# Patient Record
Sex: MALE | Race: WHITE | NOT HISPANIC OR LATINO | Employment: UNEMPLOYED | URBAN - METROPOLITAN AREA
[De-identification: names, ages, dates, MRNs, and addresses within clinical notes are randomized per-mention and may not be internally consistent; named-entity substitution may affect disease eponyms.]

---

## 2017-01-13 ENCOUNTER — HOSPITAL ENCOUNTER (OUTPATIENT)
Dept: GENERAL RADIOLOGY | Facility: HOSPITAL | Age: 47
Discharge: HOME | End: 2017-01-13
Admitting: PHYSICIAN ASSISTANT

## 2017-01-13 ENCOUNTER — OFFICE VISIT (OUTPATIENT)
Dept: INTERNAL MEDICINE | Facility: CLINIC | Age: 47
End: 2017-01-13

## 2017-01-13 VITALS
WEIGHT: 178.2 LBS | HEART RATE: 89 BPM | HEIGHT: 63 IN | SYSTOLIC BLOOD PRESSURE: 116 MMHG | RESPIRATION RATE: 20 BRPM | DIASTOLIC BLOOD PRESSURE: 64 MMHG | OXYGEN SATURATION: 99 % | BODY MASS INDEX: 31.57 KG/M2 | TEMPERATURE: 97.6 F

## 2017-01-13 DIAGNOSIS — R39.9 UTI SYMPTOMS: ICD-10-CM

## 2017-01-13 DIAGNOSIS — R11.0 NAUSEA: ICD-10-CM

## 2017-01-13 DIAGNOSIS — M54.50 ACUTE BILATERAL LOW BACK PAIN WITHOUT SCIATICA: Primary | ICD-10-CM

## 2017-01-13 DIAGNOSIS — Z87.442 HISTORY OF KIDNEY STONES: ICD-10-CM

## 2017-01-13 LAB
BILIRUB BLD-MCNC: ABNORMAL MG/DL
CLARITY, POC: ABNORMAL
COLOR UR: ABNORMAL
EXPIRATION DATE: ABNORMAL
GLUCOSE UR STRIP-MCNC: ABNORMAL MG/DL
KETONES UR QL: NEGATIVE
LEUKOCYTE EST, POC: NEGATIVE
Lab: ABNORMAL
NITRITE UR-MCNC: POSITIVE MG/ML
PH UR: 5 [PH] (ref 5–8)
PROT UR STRIP-MCNC: ABNORMAL MG/DL
RBC # UR STRIP: NEGATIVE /UL
SP GR UR: 1.02 (ref 1–1.03)
UROBILINOGEN UR QL: 8

## 2017-01-13 PROCEDURE — 99213 OFFICE O/P EST LOW 20 MIN: CPT | Performed by: PHYSICIAN ASSISTANT

## 2017-01-13 PROCEDURE — 81003 URINALYSIS AUTO W/O SCOPE: CPT | Performed by: PHYSICIAN ASSISTANT

## 2017-01-13 PROCEDURE — 74000 HC ABDOMEN KUB: CPT

## 2017-01-13 RX ORDER — CYCLOBENZAPRINE HCL 10 MG
10 TABLET ORAL 3 TIMES DAILY PRN
Qty: 30 TABLET | Refills: 0 | OUTPATIENT
Start: 2017-01-13 | End: 2020-12-15

## 2017-01-13 RX ORDER — ONDANSETRON HYDROCHLORIDE 8 MG/1
8 TABLET, FILM COATED ORAL EVERY 8 HOURS PRN
Qty: 30 TABLET | Refills: 1 | Status: SHIPPED | OUTPATIENT
Start: 2017-01-13 | End: 2017-06-19 | Stop reason: SDUPTHER

## 2017-01-13 NOTE — PATIENT INSTRUCTIONS
Follow up with urology if flexeril doesn't help back pain.  Go to the ER if develop fever, vomiting or changes in urine occur.

## 2017-01-13 NOTE — MR AVS SNAPSHOT
Jeremiah AIMEE Gibbsam   1/13/2017 1:00 PM   Office Visit    Provider:  VERO Mittal   Department:  [facility] INTERNAL MEDICINE AND PEDIATRICS   Dept Phone:  [phone]                Your Full Care Plan              Today's Medication Changes          These changes are accurate as of: 1/13/17  1:42 PM.  If you have any questions, ask your nurse or doctor.               New Medication(s)Ordered:     cyclobenzaprine 10 MG tablet   Commonly known as:  FLEXERIL   Take 1 tablet by mouth 3 (Three) Times a Day As Needed for muscle spasms.   Started by:  VERO Mittal         Medication(s)that have changed:     ondansetron 8 MG tablet   Commonly known as:  ZOFRAN   Take 1 tablet by mouth Every 8 (Eight) Hours As Needed for nausea or vomiting.   What changed:    - medication strength  - how much to take   Changed by:  VERO Mittal            Where to Get Your Medications      These medications were sent to [address] [phone]  - [phone] [place] [address]     Phone:  [phone]     cyclobenzaprine 10 MG tablet    ondansetron 8 MG tablet                  Your Updated Medication List          This list is accurate as of: 1/13/17  1:42 PM.  Always use your most recent med list.                atorvastatin 40 MG tablet   Commonly known as:  LIPITOR   Take 1 tablet by mouth Daily.       coenzyme Q10 100 MG capsule       cyclobenzaprine 10 MG tablet   Commonly known as:  FLEXERIL   Take 1 tablet by mouth 3 (Three) Times a Day As Needed for muscle spasms.       fenofibrate 145 MG tablet   Commonly known as:  TRICOR   Take 1 tablet by mouth Daily.       gabapentin 400 MG capsule   Commonly known as:  NEURONTIN   Take 1 capsule by mouth At Night As Needed (neuropathy).       HYDROcodone-acetaminophen 5-325 MG per tablet   Commonly known as:  NORCO   Take 1 tablet by mouth Every 8  (Eight) Hours As Needed for severe pain (7-10).       lisinopril 5 MG tablet   Commonly known as:  PRINIVIL,ZESTRIL   Take 1 tablet by mouth Daily.       ondansetron 8 MG tablet   Commonly known as:  ZOFRAN   Take 1 tablet by mouth Every 8 (Eight) Hours As Needed for nausea or vomiting.       sitaGLIPtin-metFORMIN  MG per tablet   Commonly known as:  JANUMET   Take 1 tablet by mouth 2 (Two) Times a Day With Meals.       VIAGRA 100 MG tablet   Generic drug:  sildenafil       vitamin D3 5000 UNITS capsule capsule               We Performed the Following     POC Urinalysis Dipstick, Automated     Urine Culture     XR Abdomen KUB       You Were Diagnosed With        Codes Comments    Acute bilateral low back pain without sciatica    -  Primary ICD-10-CM: M54.5  ICD-9-CM: 724.2, 338.19     History of kidney stones     ICD-10-CM: Z87.442  ICD-9-CM: V13.01     Nausea     ICD-10-CM: R11.0  ICD-9-CM: 787.02     UTI symptoms     ICD-10-CM: R39.9  ICD-9-CM: 788.99       Instructions    Follow up with urology if flexeril doesn't help back pain.  Go to the ER if develop fever, vomiting or changes in urine occur.     Patient Instructions History      [facility] Signup     [facility] allows you to send messages to your doctor, view your test results, renew your prescriptions, schedule appointments, and more. To sign up, go to [employer] and click on the Sign Up Now link in the New User? box. Enter your Read[employer] Activation Code exactly as it appears below along with the last four digits of your Social Security Number and your Date of Birth () to complete the sign-up process. If you do not sign up before the expiration date, you must request a new code.    Read[employer] Activation Code: [email or web address]  Expires: 2017  1:42 PM    If you have questions, you can email [email or web address] or call [phone] to talk to our Read[employer] staff. Remember, [employer] is NOT to be used for urgent needs. For  "medical emergencies, dial 911.               Other Info from Your Visit           Your Appointments     Mar 21, 2017  8:15 AM EDT   Follow Up with Maria E Bustos DO   [facility] INTERNAL MEDICINE AND PEDIATRICS (--)    [address] [address] [phone]           Arrive 15 minutes prior to appointment.              Allergies     Invokana [Canagliflozin]  Shortness Of Breath, Rash    Contrast Dye      Penicillins      Sulfa Antibiotics        Reason for Visit     Flank Pain Stated pain has started to worsen over the last few days, both sides       Vital Signs     Blood Pressure Pulse Temperature Respirations Height Weight    116/64 (BP Location: Left arm, Patient Position: Sitting) 89 97.6 °F (36.4 °C) (Temporal Artery ) 20 63\" (160 cm) 178 lb 3.2 oz (80.8 kg)    Oxygen Saturation Body Mass Index Smoking Status             99% 31.57 kg/m2 Never Smoker         Problems and Diagnoses Noted     History of kidney stones    UTI symptoms    Acute bilateral low back pain without sciatica    -  Primary    Nauseous          Results     POC Urinalysis Dipstick, Automated      Component Value Standard Range & Units    Color Orange Yellow, Straw, Dark Yellow, Татьяна    Clarity, UA Hazy Clear    Glucose, UA >=1000 mg/dL (3+) Negative, 1000 mg/dL (3+) mg/dL    Bilirubin 3 mg/dL Negative    Ketones, UA Negative Negative    Specific Gravity  1.020 1.005 - 1.030    Blood, UA Negative Negative    pH, Urine 5.0 5.0 - 8.0    Protein,  mg/dL Negative mg/dL    Urobilinogen, UA  8 Normal    Leukocytes Negative Negative    Nitrite, UA Positive Negative    Lot Number [id number]     Expiration Date 9-17                         Treatment Goal(s) as of 1/13/2017 at 1:42 PM     1. Count carbohydrates       "

## 2017-01-13 NOTE — PROGRESS NOTES
Subjective   Jeremiah Woods is a 46 y.o. male.   Chief Complaint   Patient presents with   • Flank Pain     Stated pain has started to worsen over the last few days, both sides        History of Present Illness   Pt complains of bilateral back pain x several weeks and worse in the last few days.  It is painful to bending.  It is colicky.  Pt has used pyridium.  Hx of kidney stones.  Has seen urology (Dr Gloria) and lithotripsy in the past.    The following portions of the patient's history were reviewed and updated as appropriate: allergies, current medications and problem list.    Review of Systems   Constitutional: Positive for chills. Negative for fever.   Genitourinary: Negative for difficulty urinating, dysuria, frequency and urgency.   Musculoskeletal: Positive for back pain.       Objective   Physical Exam   Constitutional: He appears well-developed and well-nourished.   HENT:   Head: Normocephalic and atraumatic.   Right Ear: External ear normal.   Left Ear: External ear normal.   Eyes: Conjunctivae are normal.   Cardiovascular: Normal rate, regular rhythm and normal heart sounds.  Exam reveals no gallop and no friction rub.    No murmur heard.  Pulmonary/Chest: Effort normal and breath sounds normal.   Abdominal: Normal appearance and bowel sounds are normal. There is no tenderness. There is CVA tenderness.   Psychiatric: He has a normal mood and affect.   Vitals reviewed.      Assessment/Plan   Jeremiah was seen today for flank pain.    Diagnoses and all orders for this visit:    Acute bilateral low back pain without sciatica  -     POC Urinalysis Dipstick, Automated  -     Urine Culture  -     cyclobenzaprine (FLEXERIL) 10 MG tablet; Take 1 tablet by mouth 3 (Three) Times a Day As Needed for muscle spasms.  -     XR Abdomen KUB    History of kidney stones  -     XR Abdomen KUB    Nausea    UTI symptoms  -     ondansetron (ZOFRAN) 8 MG tablet; Take 1 tablet by mouth Every 8 (Eight) Hours As Needed for  nausea or vomiting.    This seems musculoskeletal in nature given the bilateral nature and no hematuria.  He will f/u next week with urologist if no better..

## 2017-01-15 LAB
BACTERIA UR CULT: NO GROWTH
BACTERIA UR CULT: NORMAL

## 2017-01-16 ENCOUNTER — TELEPHONE (OUTPATIENT)
Dept: INTERNAL MEDICINE | Facility: CLINIC | Age: 47
End: 2017-01-16

## 2017-01-16 DIAGNOSIS — M54.50 ACUTE BILATERAL LOW BACK PAIN WITHOUT SCIATICA: Primary | ICD-10-CM

## 2017-01-16 DIAGNOSIS — Z87.442 HISTORY OF KIDNEY STONES: ICD-10-CM

## 2017-01-16 DIAGNOSIS — M54.9 CVA TENDERNESS: ICD-10-CM

## 2017-01-16 RX ORDER — ALOGLIPTIN AND METFORMIN HYDROCHLORIDE 12.5; 5 MG/1; MG/1
1 TABLET, FILM COATED ORAL 2 TIMES DAILY
Qty: 60 TABLET | Refills: 0 | Status: SHIPPED | OUTPATIENT
Start: 2017-01-16 | End: 2017-01-30 | Stop reason: ALTCHOICE

## 2017-01-16 NOTE — TELEPHONE ENCOUNTER
----- Message from Elva Altamirano sent at 1/16/2017  3:01 PM EST -----  Contact: self  Call back number [phone]    Would like results of scan

## 2017-01-17 NOTE — TELEPHONE ENCOUNTER
Thought i sent a results noted to you.    His urine culture didn't grow bacteria.  Xray of belly didn't show any stones blocking the outflow of urine.  How is he doing?

## 2017-01-18 ENCOUNTER — TELEPHONE (OUTPATIENT)
Dept: INTERNAL MEDICINE | Facility: CLINIC | Age: 47
End: 2017-01-18

## 2017-01-18 NOTE — TELEPHONE ENCOUNTER
----- Message from Lois Goodman sent at 1/17/2017  3:30 PM EST -----  Contact: [phone]  PT CALLED TO GET RESULTS OF CT SCAN

## 2017-01-18 NOTE — TELEPHONE ENCOUNTER
Spoke with pt and he stated that he would like to do the CT Scan. He has not made the appt with the Urologist.

## 2017-01-18 NOTE — TELEPHONE ENCOUNTER
Spoke with pt and informed him of urine cx as well as xray results. Pt stated that he is still in a lot of pain (pain scale is at 8) and the pain is not getting better. What are you planning on doing next?

## 2017-01-19 ENCOUNTER — HOSPITAL ENCOUNTER (OUTPATIENT)
Dept: CT IMAGING | Facility: HOSPITAL | Age: 47
Discharge: HOME | End: 2017-01-19
Admitting: PHYSICIAN ASSISTANT

## 2017-01-19 DIAGNOSIS — M54.6 ACUTE THORACIC BACK PAIN, UNSPECIFIED BACK PAIN LATERALITY: Primary | ICD-10-CM

## 2017-01-19 DIAGNOSIS — Z87.442 HISTORY OF KIDNEY STONES: ICD-10-CM

## 2017-01-19 DIAGNOSIS — M54.50 ACUTE BILATERAL LOW BACK PAIN WITHOUT SCIATICA: ICD-10-CM

## 2017-01-19 DIAGNOSIS — M54.9 CVA TENDERNESS: ICD-10-CM

## 2017-01-19 PROCEDURE — 74176 CT ABD & PELVIS W/O CONTRAST: CPT

## 2017-01-30 ENCOUNTER — TELEPHONE (OUTPATIENT)
Dept: INTERNAL MEDICINE | Facility: CLINIC | Age: 47
End: 2017-01-30

## 2017-01-30 RX ORDER — METFORMIN HYDROCHLORIDE EXTENDED-RELEASE TABLETS 1000 MG/1
1000 TABLET, FILM COATED, EXTENDED RELEASE ORAL 2 TIMES DAILY WITH MEALS
Qty: 60 TABLET | Refills: 2 | Status: SHIPPED | OUTPATIENT
Start: 2017-01-30 | End: 2017-01-30

## 2017-01-30 NOTE — TELEPHONE ENCOUNTER
Spoke with pt and , pt is going to check his sugar and call us back. According to what his sugar is we may restart him on metformin and discontinue janumet.

## 2017-01-30 NOTE — TELEPHONE ENCOUNTER
Spoke with pt and he states BP has been running normal. After talking with  she suggest that he D/C the Janumet and would start him on metformin 1000 mg 1 tab PO BID. Pt has been informed of this and will check sugar daily and let us know how he is doing on it.

## 2017-01-30 NOTE — TELEPHONE ENCOUNTER
With his glucose being 223 I am not for certain it is the Janumet as he has been on this before and fine. How is his BP running.

## 2017-04-07 NOTE — TELEPHONE ENCOUNTER
[phone]    PT NEEDS REFILL OF METFORMIN-IS OUT SO NEEDS TODAY PLEASE    [facility] ANGELIASALEXANDRO

## 2017-04-10 ENCOUNTER — OFFICE VISIT (OUTPATIENT)
Dept: INTERNAL MEDICINE | Facility: CLINIC | Age: 47
End: 2017-04-10

## 2017-04-10 VITALS
HEART RATE: 82 BPM | WEIGHT: 170 LBS | DIASTOLIC BLOOD PRESSURE: 78 MMHG | SYSTOLIC BLOOD PRESSURE: 108 MMHG | BODY MASS INDEX: 30.11 KG/M2 | RESPIRATION RATE: 16 BRPM

## 2017-04-10 DIAGNOSIS — G62.9 NEUROPATHY: ICD-10-CM

## 2017-04-10 DIAGNOSIS — E11.69 TYPE 2 DIABETES MELLITUS WITH OTHER SPECIFIED COMPLICATION, WITHOUT LONG-TERM CURRENT USE OF INSULIN (HCC): ICD-10-CM

## 2017-04-10 DIAGNOSIS — Z23 NEED FOR VACCINATION WITH 13-POLYVALENT PNEUMOCOCCAL CONJUGATE VACCINE: ICD-10-CM

## 2017-04-10 DIAGNOSIS — I10 ESSENTIAL HYPERTENSION: ICD-10-CM

## 2017-04-10 DIAGNOSIS — E78.5 HYPERLIPIDEMIA, UNSPECIFIED HYPERLIPIDEMIA TYPE: Primary | ICD-10-CM

## 2017-04-10 DIAGNOSIS — N52.9 IMPOTENCE OF ORGANIC ORIGIN: ICD-10-CM

## 2017-04-10 LAB
A/C: NORMAL
EXPIRATION DATE: NORMAL
EXPIRATION DATE: NORMAL
HBA1C MFR BLD: 8.6 %
Lab: NORMAL
Lab: NORMAL
POC CREATININE URINE: 200
POC MICROALBUMIN URINE: 80

## 2017-04-10 PROCEDURE — 99214 OFFICE O/P EST MOD 30 MIN: CPT | Performed by: INTERNAL MEDICINE

## 2017-04-10 PROCEDURE — 36415 COLL VENOUS BLD VENIPUNCTURE: CPT | Performed by: INTERNAL MEDICINE

## 2017-04-10 PROCEDURE — 83036 HEMOGLOBIN GLYCOSYLATED A1C: CPT | Performed by: INTERNAL MEDICINE

## 2017-04-10 PROCEDURE — 82044 UR ALBUMIN SEMIQUANTITATIVE: CPT | Performed by: INTERNAL MEDICINE

## 2017-04-10 PROCEDURE — 90670 PCV13 VACCINE IM: CPT | Performed by: INTERNAL MEDICINE

## 2017-04-10 PROCEDURE — 90471 IMMUNIZATION ADMIN: CPT | Performed by: INTERNAL MEDICINE

## 2017-04-10 RX ORDER — LISINOPRIL 2.5 MG/1
2.5 TABLET ORAL DAILY
Qty: 30 TABLET | Refills: 5 | OUTPATIENT
Start: 2017-04-10 | End: 2020-12-15

## 2017-04-10 NOTE — PROGRESS NOTES
Subjective   Jeremiah Woods is a 47 y.o. male.   Pt is here to follow up on HTN,HPL,DM2,neuropathy and ED.             History of Present Illness   Pt is here to follow up on HTN,HPL,DM2,neuropathy and ED.   All chronic issues are doing well. He has no acute complaints.               The following portions of the patient's history were reviewed and updated as appropriate: allergies, current medications, past family history, past medical history, past social history, past surgical history and problem list.               Review of Systems   Constitutional: Negative.    HENT: Negative.    Eyes: Negative.    Respiratory: Negative.    Cardiovascular:        See HPI.    Gastrointestinal: Negative.    Endocrine:        See HPI.    Genitourinary:        See HPI.    Musculoskeletal: Negative.    Skin: Negative.    Allergic/Immunologic: Negative.    Neurological:        See HPI.    Hematological: Negative.    Psychiatric/Behavioral: Negative.                   Objective   Physical Exam   Constitutional: He is oriented to person, place, and time. He appears well-developed and well-nourished.   HENT:   Head: Normocephalic and atraumatic.   Right Ear: External ear normal.   Left Ear: External ear normal.   Nose: Nose normal.   Mouth/Throat: Oropharynx is clear and moist.   Eyes: Conjunctivae and EOM are normal. Pupils are equal, round, and reactive to light.   Neck: Normal range of motion. Neck supple. No tracheal deviation present. No thyromegaly present.   Cardiovascular: Normal rate, regular rhythm, normal heart sounds and intact distal pulses.    Pulmonary/Chest: Effort normal and breath sounds normal.   Abdominal: Soft. Bowel sounds are normal. He exhibits no distension. There is no tenderness.    Jeremiah had a diabetic foot exam performed today.   During the foot exam he had a monofilament test performed.    Neurological Sensory Findings - Unaltered hot/cold right ankle/foot discrimination and unaltered hot/cold left  ankle/foot discrimination. Unaltered sharp/dull right ankle/foot discrimination and unaltered sharp/dull left ankle/foot discrimination. No right ankle/foot altered proprioception and no left ankle/foot altered proprioception    Vascular Status -  His exam exhibits right foot vasculature normal. His exam exhibits no right foot edema. His exam exhibits left foot vasculature normal. His exam exhibits no left foot edema.   Foot Structure and Biomechanics -  His right foot has no hallux valgus, no pes cavus, no claw toes, no hammer toes and no cavovarus present. His left foot has no hallux valgus, no pes cavus, no claw toes, no hammer toes and no cavovarus.      Neurological: He is alert and oriented to person, place, and time. He has normal reflexes.   Skin: Skin is warm and dry.   Psychiatric: He has a normal mood and affect.   Nursing note and vitals reviewed.              Assessment/Plan    Hyperlipidemia, unspecified hyperlipidemia type    Essential hypertension    Type 2 diabetes mellitus with other specified complication, without long-term current use of insulin  -     lisinopril (ZESTRIL) 2.5 MG tablet; Take 1 tablet by mouth Daily.  -     Comprehensive Metabolic Panel  -     Lipid Panel  -     CBC (No Diff)  -     POC Microalbumin  -     POC Glycosylated Hemoglobin (Hb A1C)    Neuropathy    Impotence of organic origin    Need for vaccination with 13-polyvalent pneumococcal conjugate vaccine  -     Pneumococcal Conjugate Vaccine 13-Valent All        1.) Foot exam done today   2.) Check CBC,CMP,lipid panel, A1C and microalbumin.   3.) Prevnar 13 received today

## 2017-04-11 LAB
ALBUMIN SERPL-MCNC: 4.5 G/DL (ref 3.2–4.8)
ALBUMIN/GLOB SERPL: 1.5 G/DL (ref 1.5–2.5)
ALP SERPL-CCNC: 76 U/L (ref 25–100)
ALT SERPL-CCNC: 58 U/L (ref 7–40)
AST SERPL-CCNC: 33 U/L (ref 0–33)
BILIRUB SERPL-MCNC: 0.7 MG/DL (ref 0.3–1.2)
BUN SERPL-MCNC: 18 MG/DL (ref 9–23)
BUN/CREAT SERPL: 18 (ref 7–25)
CALCIUM SERPL-MCNC: 10.1 MG/DL (ref 8.7–10.4)
CHLORIDE SERPL-SCNC: 102 MMOL/L (ref 99–109)
CHOLEST SERPL-MCNC: 198 MG/DL (ref 0–200)
CO2 SERPL-SCNC: 21 MMOL/L (ref 20–31)
CREAT SERPL-MCNC: 1 MG/DL (ref 0.6–1.3)
ERYTHROCYTE [DISTWIDTH] IN BLOOD BY AUTOMATED COUNT: 14.4 % (ref 11.3–14.5)
GLOBULIN SER CALC-MCNC: 3 GM/DL
GLUCOSE SERPL-MCNC: 181 MG/DL (ref 70–100)
HCT VFR BLD AUTO: 43.2 % (ref 38.9–50.9)
HDLC SERPL-MCNC: 26 MG/DL (ref 40–60)
HGB BLD-MCNC: 14.2 G/DL (ref 13.1–17.5)
LDLC SERPL CALC-MCNC: ABNORMAL MG/DL
MCH RBC QN AUTO: 28.8 PG (ref 27–31)
MCHC RBC AUTO-ENTMCNC: 32.9 G/DL (ref 32–36)
MCV RBC AUTO: 87.6 FL (ref 80–99)
PLATELET # BLD AUTO: 231 10*3/MM3 (ref 150–450)
POTASSIUM SERPL-SCNC: 4.1 MMOL/L (ref 3.5–5.5)
PROT SERPL-MCNC: 7.5 G/DL (ref 5.7–8.2)
RBC # BLD AUTO: 4.93 10*6/MM3 (ref 4.2–5.76)
SODIUM SERPL-SCNC: 137 MMOL/L (ref 132–146)
TRIGL SERPL-MCNC: 785 MG/DL (ref 0–150)
VLDLC SERPL CALC-MCNC: ABNORMAL MG/DL
WBC # BLD AUTO: 6.37 10*3/MM3 (ref 3.5–10.8)

## 2017-04-13 ENCOUNTER — TELEPHONE (OUTPATIENT)
Dept: INTERNAL MEDICINE | Facility: CLINIC | Age: 47
End: 2017-04-13

## 2017-04-13 NOTE — TELEPHONE ENCOUNTER
His A1C was 8.6% basically it was the same as prior.     Is he just taking Metformin? I thought he was taking januvia or Janumet, I do not see it on his med rec.     Once I know what he is taking for sure we can adjust his regimen I would like to get him under  7%.     Triglycerides were too high as well, I think he told me he had not been taking his cholesterol med and was going to start back. When he was taking it cholesterol was in optimal range.

## 2017-04-13 NOTE — TELEPHONE ENCOUNTER
----- Message from Tiffani Joseph sent at 4/13/2017  1:07 PM EDT -----  PT CALLED WANTING TO KNOW HIS LABS RESULTS FROM HIS LAST APPT.       HE CAN BE REACHED [phone]

## 2017-04-14 ENCOUNTER — TELEPHONE (OUTPATIENT)
Dept: INTERNAL MEDICINE | Facility: CLINIC | Age: 47
End: 2017-04-14

## 2017-04-14 NOTE — TELEPHONE ENCOUNTER
----- Message from Tami Lindo sent at 4/14/2017 11:54 AM EDT -----  PT CALLING FOR RESULTS FROM RECENT APPT TEST.      CAN BE reached [phone]

## 2017-04-14 NOTE — TELEPHONE ENCOUNTER
Spoke with pt and informed him of labs, that we are working on either adding or changing his diabetes regimen. Also he asked if he should start taking fish oil again?

## 2017-04-18 RX ORDER — PIOGLITAZONEHYDROCHLORIDE 15 MG/1
15 TABLET ORAL DAILY
Qty: 30 TABLET | Refills: 3 | Status: SHIPPED | OUTPATIENT
Start: 2017-04-18 | End: 2017-08-14

## 2017-04-18 NOTE — TELEPHONE ENCOUNTER
I spoke with pt and he states he will try the Actos 15 mg once daily and will let us know how he is doing.     Rx has been sent to pt's pharmacy.

## 2017-04-18 NOTE — TELEPHONE ENCOUNTER
He has had an issue with so many meds let me make sure I bring us up to date so we wont have to keep searching back:  His glucose was fine on Janumet but then insurance stopped paying.     At that time we tried:  Invokana-caused rash   Jentadueta and Januvia- he felt light headed     We then switched him back to Janumet and he felt like he was having side effects from there as well.     Since that time he has remained on metformin 1000 mg PO BID     At this point we can :   1.) start him on Actos 15 mg PO daily #30 with 3 refills along with continuing metformin as is    or    2.) If he is interested I could try a once a week insulin to add to the metformin twice daily? It is called Trulicity and we would have to see about insurance approval.

## 2017-05-02 ENCOUNTER — TELEPHONE (OUTPATIENT)
Dept: INTERNAL MEDICINE | Facility: CLINIC | Age: 47
End: 2017-05-02

## 2017-05-04 ENCOUNTER — TELEPHONE (OUTPATIENT)
Dept: INTERNAL MEDICINE | Facility: CLINIC | Age: 47
End: 2017-05-04

## 2017-05-10 RX ORDER — GLIPIZIDE 5 MG/1
5 TABLET ORAL
Qty: 60 TABLET | Refills: 5 | Status: SHIPPED | OUTPATIENT
Start: 2017-05-10 | End: 2017-06-19 | Stop reason: SDUPTHER

## 2017-05-18 ENCOUNTER — OFFICE VISIT (OUTPATIENT)
Dept: INTERNAL MEDICINE | Facility: CLINIC | Age: 47
End: 2017-05-18

## 2017-05-18 VITALS
SYSTOLIC BLOOD PRESSURE: 102 MMHG | BODY MASS INDEX: 30.16 KG/M2 | DIASTOLIC BLOOD PRESSURE: 62 MMHG | HEART RATE: 88 BPM | RESPIRATION RATE: 16 BRPM | HEIGHT: 63 IN | WEIGHT: 170.2 LBS

## 2017-05-18 DIAGNOSIS — H92.03 OTALGIA OF BOTH EARS: Primary | ICD-10-CM

## 2017-05-18 PROCEDURE — 99213 OFFICE O/P EST LOW 20 MIN: CPT | Performed by: PHYSICIAN ASSISTANT

## 2017-05-18 RX ORDER — LISINOPRIL 5 MG/1
5 TABLET ORAL DAILY
COMMUNITY
Start: 2017-04-27 | End: 2017-09-11 | Stop reason: DRUGHIGH

## 2017-06-19 ENCOUNTER — TELEPHONE (OUTPATIENT)
Dept: INTERNAL MEDICINE | Facility: CLINIC | Age: 47
End: 2017-06-19

## 2017-06-19 DIAGNOSIS — R39.9 UTI SYMPTOMS: ICD-10-CM

## 2017-06-19 RX ORDER — METFORMIN HYDROCHLORIDE 500 MG/1
TABLET, EXTENDED RELEASE ORAL
Qty: 120 TABLET | Refills: 0 | Status: SHIPPED | OUTPATIENT
Start: 2017-06-19 | End: 2017-08-14 | Stop reason: DRUGHIGH

## 2017-06-19 RX ORDER — GLIPIZIDE 5 MG/1
5 TABLET ORAL
Qty: 60 TABLET | Refills: 5 | Status: SHIPPED | OUTPATIENT
Start: 2017-06-19

## 2017-06-19 RX ORDER — ONDANSETRON HYDROCHLORIDE 8 MG/1
8 TABLET, FILM COATED ORAL EVERY 8 HOURS PRN
Qty: 30 TABLET | Refills: 1 | Status: SHIPPED | OUTPATIENT
Start: 2017-06-19 | End: 2017-12-22 | Stop reason: SDUPTHER

## 2017-06-19 RX ORDER — GLIPIZIDE 10 MG/1
TABLET ORAL
Qty: 90 TABLET | Refills: 0 | OUTPATIENT
Start: 2017-06-19

## 2017-06-19 NOTE — TELEPHONE ENCOUNTER
----- Message from Ryan Albarran sent at 6/19/2017 12:29 PM EDT -----  Contact: SELF  PATIENT SAYS HE NEED A REFILL ON METFORMIN, GLIPIZIDE, AND ONDANSETRON. PATIENTS PRIMARY PHARMACY IS [facility] IN [place]. A GOOD CALL BACK NUMBER [phone]. THANK YOU.

## 2017-06-19 NOTE — TELEPHONE ENCOUNTER
Pt is going on vacation and may go fishing. States that being on a boat makes him nauseated. He would like to know if there is anything that may help with this? States that Dramamine makes him very drowsy so he would rather not take it.

## 2017-06-20 DIAGNOSIS — T75.3XXA MOTION SICKNESS, INITIAL ENCOUNTER: Primary | ICD-10-CM

## 2017-06-20 RX ORDER — SCOLOPAMINE TRANSDERMAL SYSTEM 1 MG/1
1 PATCH, EXTENDED RELEASE TRANSDERMAL
Qty: 4 PATCH | Refills: 1 | Status: SHIPPED | OUTPATIENT
Start: 2017-06-20 | End: 2017-09-11

## 2017-08-14 ENCOUNTER — TELEPHONE (OUTPATIENT)
Dept: INTERNAL MEDICINE | Facility: CLINIC | Age: 47
End: 2017-08-14

## 2017-08-14 ENCOUNTER — OFFICE VISIT (OUTPATIENT)
Dept: INTERNAL MEDICINE | Facility: CLINIC | Age: 47
End: 2017-08-14

## 2017-08-14 VITALS
BODY MASS INDEX: 31.22 KG/M2 | HEART RATE: 80 BPM | WEIGHT: 176.25 LBS | SYSTOLIC BLOOD PRESSURE: 104 MMHG | DIASTOLIC BLOOD PRESSURE: 62 MMHG | RESPIRATION RATE: 16 BRPM

## 2017-08-14 DIAGNOSIS — R10.9 FLANK PAIN, ACUTE: Primary | ICD-10-CM

## 2017-08-14 LAB
BILIRUB BLD-MCNC: NEGATIVE MG/DL
CLARITY, POC: CLEAR
COLOR UR: YELLOW
EXPIRATION DATE: NORMAL
GLUCOSE UR STRIP-MCNC: NEGATIVE MG/DL
KETONES UR QL: NEGATIVE
LEUKOCYTE EST, POC: NEGATIVE
Lab: NORMAL
NITRITE UR-MCNC: NEGATIVE MG/ML
PH UR: 5 [PH] (ref 5–8)
PROT UR STRIP-MCNC: NEGATIVE MG/DL
RBC # UR STRIP: NEGATIVE /UL
SP GR UR: 1.02 (ref 1–1.03)
UROBILINOGEN UR QL: NORMAL

## 2017-08-14 PROCEDURE — 99214 OFFICE O/P EST MOD 30 MIN: CPT | Performed by: INTERNAL MEDICINE

## 2017-08-14 PROCEDURE — 81003 URINALYSIS AUTO W/O SCOPE: CPT | Performed by: INTERNAL MEDICINE

## 2017-08-14 RX ORDER — HYDROCODONE BITARTRATE AND ACETAMINOPHEN 5; 325 MG/1; MG/1
1 TABLET ORAL EVERY 8 HOURS PRN
Qty: 20 TABLET | Refills: 0 | Status: SHIPPED | OUTPATIENT
Start: 2017-08-14 | End: 2017-12-22

## 2017-08-14 NOTE — TELEPHONE ENCOUNTER
Let patient know urinalysis was completely negative no blood, white cells or nitrites. If low back pain does not resolve we will need to do CT scan to check for kidney stones.

## 2017-08-14 NOTE — PROGRESS NOTES
Subjective   Jeremiah Woods is a 47 y.o. male.   Pt presents today with bilateral flank pain.               History of Present Illness   Pt presetns today with bilateral flank pain. He states he has chilled as well. Symptoms started 1 month ago off and on, and then over the weekend it increased in pain in consistency. He denies hematuria.   He has had multiple episodes of Nephrolithiasis in the past.             The following portions of the patient's history were reviewed and updated as appropriate: allergies, current medications, past family history, past medical history, past social history, past surgical history and problem list.           Review of Systems   Constitutional: Negative.    HENT: Negative.    Eyes: Negative.    Respiratory: Negative.    Cardiovascular: Negative.    Gastrointestinal: Negative.    Endocrine: Negative.    Genitourinary:        See HPI.    Musculoskeletal: Negative.    Skin: Negative.    Allergic/Immunologic: Negative.    Neurological: Negative.    Hematological: Negative.    Psychiatric/Behavioral: Negative.               Objective   Physical Exam   Constitutional: He is oriented to person, place, and time. He appears well-developed and well-nourished.   HENT:   Head: Normocephalic and atraumatic.   Cardiovascular: Normal rate, regular rhythm and normal heart sounds.    Pulmonary/Chest: Effort normal and breath sounds normal.   Abdominal: Soft. Bowel sounds are normal.   Bilateral flank pain to palpation.    Neurological: He is alert and oriented to person, place, and time.   Skin: Skin is warm and dry.   Psychiatric: He has a normal mood and affect.   Nursing note and vitals reviewed.            Assessment/Plan    Flank pain, acute  -     POCT urinalysis dipstick, automated  -     HYDROcodone-acetaminophen (NORCO) 5-325 MG per tablet; Take 1 tablet by mouth Every 8 (Eight) Hours As Needed for Severe Pain (7-10) (flank pain).        1.) Urinalysis ( if positive for infection will  send in Cipro)   2.) Lortab 5 mg PO every 8 hours prn #20 with on refills. ( for flank pain and incase of kidney stones he has had numerous in the past and this is more likely to be the case now, if Urinalysis negative for infection we will order CT to check).

## 2017-09-11 ENCOUNTER — TELEPHONE (OUTPATIENT)
Dept: INTERNAL MEDICINE | Facility: CLINIC | Age: 47
End: 2017-09-11

## 2017-09-11 ENCOUNTER — OFFICE VISIT (OUTPATIENT)
Dept: INTERNAL MEDICINE | Facility: CLINIC | Age: 47
End: 2017-09-11

## 2017-09-11 VITALS
RESPIRATION RATE: 18 BRPM | WEIGHT: 173 LBS | OXYGEN SATURATION: 98 % | SYSTOLIC BLOOD PRESSURE: 102 MMHG | BODY MASS INDEX: 30.65 KG/M2 | TEMPERATURE: 98.4 F | DIASTOLIC BLOOD PRESSURE: 70 MMHG | HEART RATE: 86 BPM

## 2017-09-11 DIAGNOSIS — R30.9 PAINFUL URINATION: Primary | ICD-10-CM

## 2017-09-11 DIAGNOSIS — R10.9 FLANK PAIN: ICD-10-CM

## 2017-09-11 LAB
BACTERIA UR QL AUTO: ABNORMAL /HPF
BILIRUB BLD-MCNC: NEGATIVE MG/DL
CLARITY, POC: CLEAR
COLOR UR: YELLOW
EXPIRATION DATE: ABNORMAL
GLUCOSE UR STRIP-MCNC: ABNORMAL MG/DL
HYALINE CASTS UR QL AUTO: ABNORMAL /LPF
KETONES UR QL: NEGATIVE
LEUKOCYTE EST, POC: NEGATIVE
Lab: ABNORMAL
NITRITE UR-MCNC: NEGATIVE MG/ML
PH UR: 6 [PH] (ref 5–8)
PROT UR STRIP-MCNC: NEGATIVE MG/DL
RBC # UR STRIP: NEGATIVE /UL
RBC # UR: ABNORMAL /HPF
REF LAB TEST METHOD: ABNORMAL
SP GR UR: 1.02 (ref 1–1.03)
SQUAMOUS #/AREA URNS HPF: ABNORMAL /HPF
UROBILINOGEN UR QL: NORMAL
WBC UR QL AUTO: ABNORMAL /HPF

## 2017-09-11 PROCEDURE — 99214 OFFICE O/P EST MOD 30 MIN: CPT | Performed by: NURSE PRACTITIONER

## 2017-09-11 PROCEDURE — 87086 URINE CULTURE/COLONY COUNT: CPT | Performed by: NURSE PRACTITIONER

## 2017-09-11 PROCEDURE — 81015 MICROSCOPIC EXAM OF URINE: CPT | Performed by: NURSE PRACTITIONER

## 2017-09-11 PROCEDURE — 81003 URINALYSIS AUTO W/O SCOPE: CPT | Performed by: NURSE PRACTITIONER

## 2017-09-11 NOTE — TELEPHONE ENCOUNTER
----- Message from Татьяна Mittal sent at 9/11/2017  2:22 PM EDT -----  PT NEEDS RENEWAL FOR METFORMIN SENT TO [facility] [place]    PATIENT: [phone]

## 2017-09-11 NOTE — PROGRESS NOTES
Chief Complaint   Patient presents with   • Back Pain        Subjective     History of Present Illness   The pt is here today with reports of B flank R>L not doing better.  PMH stones.    The pt was seen 8/14/17 in [facility]. Has lortab but tries not to take.  Wife thinks he feels good and he has had chills and warmth and had had sinus problems doing better with sinus.    Occasional dysuria.     The following portions of the patient's history were reviewed and updated as appropriate: allergies, current medications, past family history, past medical history, past social history, past surgical history and problem list.    Review of Systems   Constitutional: Positive for chills. Negative for activity change, appetite change, fatigue, fever and unexpected weight change.   HENT: Negative for congestion.    Respiratory: Negative for shortness of breath.    Cardiovascular: Negative for chest pain and leg swelling.   Gastrointestinal: Positive for diarrhea and nausea. Negative for abdominal pain and vomiting.   Genitourinary: Positive for difficulty urinating. Negative for hematuria.   Musculoskeletal: Positive for back pain.   Neurological: Negative for dizziness and headaches.   Psychiatric/Behavioral: Positive for sleep disturbance.   All other systems reviewed and are negative.      Objective   Physical Exam   Constitutional: He is oriented to person, place, and time. He appears well-developed and well-nourished.   HENT:   Head: Normocephalic and atraumatic.   Right Ear: External ear normal.   Left Ear: External ear normal.   Nose: Nose normal.   Mouth/Throat: Oropharynx is clear and moist.   Eyes: Conjunctivae are normal. Pupils are equal, round, and reactive to light. No scleral icterus.   Neck: Neck supple. No thyromegaly present.   Cardiovascular: Normal rate and regular rhythm.    Pulmonary/Chest: Effort normal and breath sounds normal.   Abdominal: Bowel sounds are normal. He exhibits no distension and no mass.  There is no tenderness. There is no rebound and no guarding. No hernia.   Musculoskeletal: Normal range of motion.   B cva ttp   Lymphadenopathy:     He has no cervical adenopathy.   Neurological: He is alert and oriented to person, place, and time.   Skin: Skin is warm and dry.   Psychiatric: He has a normal mood and affect. His behavior is normal.   Nursing note and vitals reviewed.    B flank pain  Results for orders placed or performed in visit on 09/11/17   Urine Culture   Result Value Ref Range    Urine Culture No growth at 2 days    Urinalysis, Microscopic Only   Result Value Ref Range    RBC, UA 3-6 (A) None Seen, 0-2 /HPF    WBC, UA 0-2 (A) None Seen /HPF    Bacteria, UA None Seen None Seen, Trace /HPF    Squamous Epithelial Cells, UA 0-2 None Seen, 0-2 /HPF    Hyaline Casts, UA 0-6 0 - 6 /LPF    Methodology Automated Microscopy    POCT urinalysis dipstick, automated   Result Value Ref Range    Color Yellow Yellow, Straw, Dark Yellow, Татьяна    Clarity, UA Clear Clear    Glucose,  mg/dL (A) Negative, 1000 mg/dL (3+) mg/dL    Bilirubin Negative Negative    Ketones, UA Negative Negative    Specific Gravity  1.020 1.005 - 1.030    Blood, UA Negative Negative    pH, Urine 6.0 5.0 - 8.0    Protein, POC Negative Negative mg/dL    Urobilinogen, UA Normal Normal    Leukocytes Negative Negative    Nitrite, UA Negative Negative    Lot Number [id number]     Expiration Date 5-31-18         Assessment/Plan   Jeremiah was seen today for back pain.    Diagnoses and all orders for this visit:    Painful urination  -     POCT urinalysis dipstick, automated  -     Cancel: CT Abdomen Pelvis Stone Protocol; Future  -     Urine Culture  -     Urinalysis, Microscopic Only    Flank pain  -     Cancel: CT Abdomen Pelvis Stone Protocol; Future  -     Urine Culture  -     Urinalysis, Microscopic Only        Follow up as imaging made available  RTC/call  If symptoms worsen  Meds MOA and SE's reviewed and pt v/u

## 2017-09-12 ENCOUNTER — TELEPHONE (OUTPATIENT)
Dept: INTERNAL MEDICINE | Facility: CLINIC | Age: 47
End: 2017-09-12

## 2017-09-12 NOTE — TELEPHONE ENCOUNTER
----- Message from Paige Lazaro sent at 9/12/2017  4:20 PM EDT -----  You were going to order ultrasound for patient instead since CT was denied. Can you enter order? Thanks

## 2017-09-13 ENCOUNTER — HOSPITAL ENCOUNTER (OUTPATIENT)
Dept: ULTRASOUND IMAGING | Facility: HOSPITAL | Age: 47
Discharge: HOME | End: 2017-09-13
Admitting: NURSE PRACTITIONER

## 2017-09-13 DIAGNOSIS — Z87.442 HISTORY OF KIDNEY STONES: ICD-10-CM

## 2017-09-13 DIAGNOSIS — R10.9 FLANK PAIN: ICD-10-CM

## 2017-09-13 DIAGNOSIS — R31.9 HEMATURIA: Primary | ICD-10-CM

## 2017-09-13 DIAGNOSIS — R31.9 HEMATURIA: ICD-10-CM

## 2017-09-13 LAB — BACTERIA SPEC AEROBE CULT: NORMAL

## 2017-09-13 PROCEDURE — 76775 US EXAM ABDO BACK WALL LIM: CPT

## 2017-09-14 DIAGNOSIS — R10.9 ABDOMINAL PAIN, UNSPECIFIED LOCATION: ICD-10-CM

## 2017-09-14 DIAGNOSIS — N20.0 NEPHROLITHIASIS: ICD-10-CM

## 2017-09-14 DIAGNOSIS — R31.9 HEMATURIA: Primary | ICD-10-CM

## 2017-09-18 ENCOUNTER — TELEPHONE (OUTPATIENT)
Dept: INTERNAL MEDICINE | Facility: CLINIC | Age: 47
End: 2017-09-18

## 2017-09-18 NOTE — TELEPHONE ENCOUNTER
Checking the see about the patient's urology consult.,  Check on patient today still in a considerable amount of discomfort.  He does have mild dilatation of his kidney.  Please let me know since they can get this patient had an thank you

## 2017-10-23 NOTE — TELEPHONE ENCOUNTER
Pt called and stated that Rx Metformin never did get to pharmacy. Rx Metformin refilled per chart via fax(did see where Rx was not sent to pharmacy). Pt notified and verbalized understanding.

## 2017-12-22 ENCOUNTER — OFFICE VISIT (OUTPATIENT)
Dept: RETAIL CLINIC | Facility: CLINIC | Age: 47
End: 2017-12-22

## 2017-12-22 VITALS
DIASTOLIC BLOOD PRESSURE: 58 MMHG | WEIGHT: 172 LBS | RESPIRATION RATE: 18 BRPM | SYSTOLIC BLOOD PRESSURE: 100 MMHG | HEART RATE: 107 BPM | HEIGHT: 63 IN | BODY MASS INDEX: 30.48 KG/M2 | OXYGEN SATURATION: 97 % | TEMPERATURE: 98 F

## 2017-12-22 DIAGNOSIS — J40 BRONCHITIS: Primary | ICD-10-CM

## 2017-12-22 DIAGNOSIS — R11.0 NAUSEA: ICD-10-CM

## 2017-12-22 DIAGNOSIS — R39.9 UTI SYMPTOMS: ICD-10-CM

## 2017-12-22 PROCEDURE — 99213 OFFICE O/P EST LOW 20 MIN: CPT | Performed by: NURSE PRACTITIONER

## 2017-12-22 RX ORDER — ALBUTEROL SULFATE 90 UG/1
2 AEROSOL, METERED RESPIRATORY (INHALATION) EVERY 4 HOURS PRN
Qty: 1 INHALER | Refills: 0 | Status: SHIPPED | OUTPATIENT
Start: 2017-12-22

## 2017-12-22 RX ORDER — AZITHROMYCIN 250 MG/1
TABLET, FILM COATED ORAL
Qty: 6 TABLET | Refills: 0 | OUTPATIENT
Start: 2017-12-22 | End: 2020-12-15

## 2017-12-22 RX ORDER — ONDANSETRON HYDROCHLORIDE 8 MG/1
8 TABLET, FILM COATED ORAL EVERY 8 HOURS PRN
Qty: 30 TABLET | Refills: 1 | Status: SHIPPED | OUTPATIENT
Start: 2017-12-22 | End: 2018-01-01

## 2017-12-22 RX ORDER — DEXTROMETHORPHAN HYDROBROMIDE AND PROMETHAZINE HYDROCHLORIDE 15; 6.25 MG/5ML; MG/5ML
5 SYRUP ORAL NIGHTLY PRN
Qty: 118 ML | Refills: 0 | Status: SHIPPED | OUTPATIENT
Start: 2017-12-22 | End: 2018-01-01

## 2017-12-22 NOTE — PROGRESS NOTES
"Subjective   Jeremiah Woods is a 47 y.o. male    CHIEF COMPLAINT  Sore Throat      Sore Throat    This is a new problem. Episode onset: 4 days. The problem has been gradually worsening. The pain is worse on the left side. The maximum temperature recorded prior to his arrival was 101 - 101.9 F. The pain is at a severity of 8/10. Associated symptoms include abdominal pain, congestion, coughing, diarrhea, ear pain, headaches, a hoarse voice, shortness of breath and vomiting. Pertinent negatives include no drooling, ear discharge, plugged ear sensation, swollen glands or trouble swallowing. He has tried NSAIDs (cough and cold, mucinex) for the symptoms. The treatment provided mild relief.       The following portions of the patient's history were reviewed and updated as appropriate: allergies, current mediations, past family history, past medical history, past social history, past surgical history, and problem list.    Review of Systems   Constitutional: Positive for chills, fatigue and fever.   HENT: Positive for congestion, ear pain, hoarse voice, postnasal drip, rhinorrhea, sneezing, sore throat, tinnitus and voice change. Negative for drooling, ear discharge and trouble swallowing.    Eyes: Negative for pain and itching.   Respiratory: Positive for cough, shortness of breath and wheezing.    Gastrointestinal: Positive for abdominal pain, diarrhea, nausea and vomiting.   Neurological: Positive for dizziness, light-headedness and headaches.         Objective   Allergies   Allergen Reactions   • Invokana [Canagliflozin] Shortness Of Breath and Rash   • Contrast Dye    • Penicillins    • Sulfa Antibiotics    • Adhesive Tape Rash         /58  Pulse 107  Temp 98 °F (36.7 °C) (Temporal Artery )   Resp 18  Ht 160 cm (63\")  Wt 78 kg (172 lb)  SpO2 97%  BMI 30.47 kg/m2    Physical Exam   Constitutional: He is oriented to person, place, and time. He appears well-developed and well-nourished. He appears ill. "   HENT:   Head: Normocephalic.   Right Ear: Tympanic membrane, external ear and ear canal normal.   Left Ear: Tympanic membrane, external ear and ear canal normal.   Nose: Mucosal edema and sinus tenderness present. Right sinus exhibits no maxillary sinus tenderness and no frontal sinus tenderness. Left sinus exhibits no maxillary sinus tenderness and no frontal sinus tenderness.   Mouth/Throat: Uvula is midline. Posterior oropharyngeal erythema present. No oropharyngeal exudate or posterior oropharyngeal edema. No tonsillar exudate.   Eyes: Conjunctivae are normal.   Cardiovascular: Normal rate and regular rhythm.    Pulmonary/Chest: Effort normal. He has decreased breath sounds in the right lower field and the left lower field. He has wheezes.   Paroxysmal coughing during exam.   Abdominal: Soft. Bowel sounds are normal. He exhibits no distension. There is no tenderness. There is no rebound and no guarding.   Lymphadenopathy:     He has no cervical adenopathy.   Neurological: He is alert and oriented to person, place, and time.       Assessment/Plan   Jeremiah was seen today for sore throat.    Diagnoses and all orders for this visit:    Bronchitis  -     albuterol (PROVENTIL HFA;VENTOLIN HFA) 108 (90 Base) MCG/ACT inhaler; Inhale 2 puffs Every 4 (Four) Hours As Needed for Wheezing.  -     promethazine-dextromethorphan (PROMETHAZINE-DM) 6.25-15 MG/5ML syrup; Take 5 mL by mouth At Night As Needed for Cough for up to 10 days.  -     azithromycin (ZITHROMAX Z-SEBLE) 250 MG tablet; Take 2 tablets the first day, then 1 tablet daily for 4 days.  -     loratadine-pseudoephedrine (CLARITIN-D 12 HOUR) 5-120 MG per 12 hr tablet; Take 1 tablet by mouth 2 (Two) Times a Day for 10 days.  - Advised to drink plenty of clear, non-caffeinated fluids, as tolerated.  - Continue taking mucinex per package directions.  - Acetaminophen or ibuprofen, per package directions, for fever, headache, sore throat as needed.    Nausea  -      ondansetron (ZOFRAN) 8 MG tablet; Take 1 tablet by mouth Every 8 (Eight) Hours As Needed for Nausea or Vomiting for up to 10 days.  - [place] diet, no dairy.  Advance diet as tolerated, when nausea improves.       An After Visit Summary was printed, reviewed, and given to the patient. Understanding verbalized and agrees with treatment plan.  If no improvement or becomes worse, follow up with primary or go to [facility]/ER.        December 22, 2017  9:10 AM

## 2018-08-21 NOTE — PATIENT INSTRUCTIONS
Progress Note      Patient Name:  Miko Hsu    MRN:  [id number]    Today's Date:  8/21/2018    Chief Complaint: mood swings, poor coping, irritability, aggressive behavior    Subjective/Interim History:   Situation is essentially unchanged from visit yesterday. Mom brought in list of patient's many medications as well as past medications and reactions. They are as follows:    PTA Meds:    Lamictal 300mg at bedtime  Topiramate 50mg at bedtime  Fluoxetine 50 mg daily  brexpiprazole 1.5 mg daily  Vyvanse 70mg QAM  Guanfacine ER 3mg QAM    Past Meds:    Clonidine -> increased urinary frequency  Aderrall -> \"crashes\" when wore off  zoloft -> increased suicidal ideation        Review of Systems:  Patient denies nausea, vomiting, headaches, dizziness, stiffness of muscles.    Past Medical History:    Past Medical History:   Diagnosis Date   • Keratoconus of both eyes     Right-eye cross-linked   • Osteomyelitis (CMS/HCC)     addressed   • RAD (reactive airway disease)    • Seasonal allergies        Allergies:    ALLERGIES:   Allergen Reactions   • Cat Dander Other (See Comments)     ASTHMA SYMPTOMS   • Peanuts [Peanut] Other (See Comments)     AGITATION   • Peas [Pea] Other (See Comments)     AGITATION   • Seasonal Other (See Comments)     ITCHY EYES, RUNNY NOSE   • Soy Allergy Other (See Comments)     ECZEMA         Vital Signs:    There were no vitals taken for this visit.      Mental Status Exam:       See note from yesterday          Assessment/Treatment Plan:    ADHD     GENO     R/o DMDD, MDD, ODD      Medications:    INCREASE:  Lamictal to 200mg BID  Guanfacine to 4mg qAM  CONTINUE:  Topiramate 50mg at bedtime  Fluoxetine 50 mg daily  brexpiprazole 1.5 mg daily  Vyvanse 70mg QAM    Discussed with patient above stated medication plan.     Discussed with treatment team regarding patient's behaviors/symptoms and engagement in treatment and regarding  Acute Bronchitis  Bronchitis is when the airways that extend from the windpipe into the lungs get red, puffy, and painful (inflamed). Bronchitis often causes thick spit (mucus) to develop. This leads to a cough. A cough is the most common symptom of bronchitis.  In acute bronchitis, the condition usually begins suddenly and goes away over time (usually in 2 weeks). Smoking, allergies, and asthma can make bronchitis worse. Repeated episodes of bronchitis may cause more lung problems.  HOME CARE  · Rest.  · Drink enough fluids to keep your pee (urine) clear or pale yellow (unless you need to limit fluids as told by your doctor).  · Only take over-the-counter or prescription medicines as told by your doctor.  · Avoid smoking and secondhand smoke. These can make bronchitis worse. If you are a smoker, think about using nicotine gum or skin patches. Quitting smoking will help your lungs heal faster.  · Reduce the chance of getting bronchitis again by:    Washing your hands often.    Avoiding people with cold symptoms.    Trying not to touch your hands to your mouth, nose, or eyes.  · Follow up with your doctor as told.  GET HELP IF:  Your symptoms do not improve after 1 week of treatment. Symptoms include:  · Cough.  · Fever.  · Coughing up thick spit.  · Body aches.  · Chest congestion.  · Chills.  · Shortness of breath.  · Sore throat.  GET HELP RIGHT AWAY IF:   · You have an increased fever.  · You have chills.  · You have severe shortness of breath.  · You have bloody thick spit (sputum).  · You throw up (vomit) often.  · You lose too much body fluid (dehydration).  · You have a severe headache.  · You faint.  MAKE SURE YOU:   · Understand these instructions.  · Will watch your condition.  · Will get help right away if you are not doing well or get worse.     This information is not intended to replace advice given to you by your health care provider. Make sure you discuss any questions you have with your health care  follow up/aftercare plans.     Discussed in detail ongoing treatment, medication changes, therapeutic interventions used and response to treatment with family-    ELOS: 5-10 days      Joel Sanchez  8/21/2018                 provider.     Document Released: 06/05/2009 Document Revised: 08/20/2014 Document Reviewed: 06/10/2014  Elsevier Interactive Patient Education ©2017 [employer]

## 2019-08-15 NOTE — TELEPHONE ENCOUNTER
----- Message from Dani Hammer MD sent at 8/15/2019 11:10 AM CDT -----  Will review with patient at followup visit.     ----- Message from Tiffani Joseph sent at 1/30/2017  1:58 PM EST -----  Contact: self  Pt called and stated that he might be having problems with his diabetic medication. He did not know the name of it. He stated that he has been passing out every now and then and his joints are tight. He thinks it might be due to the medication. He wanted to know what Dr Bustos thought about this.  He can be reached at [phone]    [facility] in [place]

## 2020-10-22 NOTE — TELEPHONE ENCOUNTER
Possible moisturizers to try: Cetaphil, CeraVe, Vanicream. Not as greasy as some of the other brands.    Sun protection recommendations:   Use a sunscreen with an SPF of at least 30 and reapply it every 2 hours or sooner if wet.   Wear sun protective clothing such as a wide-brim hat  Avoid tanning booths    Can try OTC minoxidil 5% foam (Rogaine). After you shower, you can apply the foam when your hair is slightly wet and not completely damp.     Wound Care After a Biopsy    What is a skin biopsy?  A skin biopsy allows the doctor to examine a very small piece of tissue under the microscope to determine the diagnosis and the best treatment for the skin condition. A local anesthetic (numbing medicine)  is injected with a very small needle into the skin area to be tested. A small piece of skin is taken from the area. Sometimes a suture (stitch) is used.     What are the risks of a skin biopsy?  I will experience scar, bleeding, swelling, pain, crusting and redness. I may experience incomplete removal or recurrence. Risks of this procedure are excessive bleeding, bruising, infection, nerve damage, numbness, thick (hypertrophic or keloidal) scar and non-diagnostic biopsy.    How should I care for my wound for the first 24 hours?    Keep the wound dry and covered for 24 hours    If it bleeds, hold direct pressure on the area for 15 minutes. If bleeding does not stop then go to the emergency room    Avoid strenuous exercise the first 1-2 days or as your doctor instructs you    How should I care for the wound after 24 hours?    After 24 hours, remove the bandage    You may bathe or shower as normal    If you had a scalp biopsy, you can shampoo as usual and can use shower water to clean the biopsy site daily    Clean the wound twice a day with gentle soap and water    Do not scrub, be gentle    Apply white petroleum/Vaseline after cleaning the wound with a cotton swab or a clean finger, and keep the site covered with a  The soonest I could get pt scheduled was at  for Oct 4th, I will call him with this appt, but is this soon enough? Another option would be for you to call  on call urologist and speak to them about getting pt worked in sooner.     Bandaid /bandage. Bandages are not necessary with a scalp biopsy    If you are unable to cover the site with a Bandaid /bandage, re-apply ointment 2-3 times a day to keep the site moist. Moisture will help with healing    Avoid strenuous activity for first 1-2 days    Avoid lakes, rivers, pools, and oceans until the stitches are removed or the site is healed    How do I clean my wound?    Wash hands thoroughly with soap or use hand  before all wound care    Clean the wound with gentle soap and water    Apply white petroleum/Vaseline  to wound after it is clean    Replace the Bandaid /bandage to keep the wound covered for the first few days or as instructed by your doctor    If you had a scalp biopsy, warm shower water to the area on a daily basis should suffice    What should I use to clean my wound?     Cotton-tipped applicators (Qtips )    White petroleum jelly (Vaseline ). Use a clean new container and use Q-tips to apply.    Bandaids   as needed    Gentle soap     How should I care for my wound long term?    Do not get your wound dirty    Keep up with wound care for one week or until the area is healed.    A small scab will form and fall off by itself when the area is completely healed. The area will be red and will become pink in color as it heals. Sun protection is very important for how your scar will turn out. Sunscreen with an SPF 30 or greater is recommended once the area is healed.    If you have stitches, stitches need to be removed in 14 days. You may return to our clinic for this or you may have it done locally at your doctor s office.    You should have some soreness but it should be mild and slowly go away over several days. Talk to your doctor about using tylenol for pain,    When should I call my doctor?  If you have increased:     Pain or swelling    Pus or drainage (clear or slightly yellow drainage is ok)    Temperature over 100F    Spreading redness or warmth around wound    When will  I hear about my results?  The biopsy results can take 2-3 weeks to come back. The clinic will call you with the results, send you a Yilliot message, or have you schedule a follow-up clinic or phone time to discuss the results. Contact our clinics if you do not hear from us in 3 weeks.     Who should I call with questions?    [facility]: [phone]     [facility]: [phone]    For urgent needs outside of business hours call the [facility] at [phone] and ask for the dermatology resident on call    Cryotherapy    What is it?    Use of a very cold liquid, such as liquid nitrogen, to freeze and destroy abnormal skin cells that need to be removed    What should I expect?    Tenderness and redness    A small blister that might grow and fill with dark purple blood. There may be crusting.    More than one treatment may be needed if the lesions do not go away.    How do I care for the treated area?    Gently wash the area with your hands when bathing.    Use a thin layer of Vaseline to help with healing. You may use a Band-Aid.     The area should heal within 7-10 days and may leave behind a pink or lighter color.     Do not use an antibiotic or Neosporin ointment.     You may take acetaminophen (Tylenol) for pain.     Call your Doctor if you have:    Severe pain    Signs of infection (warmth, redness, cloudy yellow drainage, and or a bad smell)    Questions or concerns    Who should I call with questions?       [facility]: [phone]       [facility]: [phone]       For urgent needs outside of business hours call the [facility] at [phone]        and ask for the dermatology resident on call

## 2022-02-07 NOTE — TELEPHONE ENCOUNTER
----- Message from Elva Altamirano sent at 1/30/2017  3:36 PM EST -----  Contact: pharmacy  Needs to change script to Metformin ER 500mg 2 at a time is this okay?    [facility] [address] [place] [address] - [address] [place] [address] [phone] [place] [phone]    Partial Purse String (Intermediate) Text: Given the location of the defect and the characteristics of the surrounding skin an intermediate purse string closure was deemed most appropriate.  Undermining was performed circumfirentially around the surgical defect.  A purse string suture was then placed and tightened. Wound tension only allowed a partial closure of the circular defect.

## 2024-04-09 ENCOUNTER — LAB (OUTPATIENT)
Dept: LAB | Facility: HOSPITAL | Age: 54
End: 2024-04-09
Payer: MEDICAID

## 2024-04-09 ENCOUNTER — OFFICE VISIT (OUTPATIENT)
Dept: NEUROLOGY | Facility: CLINIC | Age: 54
End: 2024-04-09
Payer: MEDICAID

## 2024-04-09 VITALS
OXYGEN SATURATION: 94 % | SYSTOLIC BLOOD PRESSURE: 118 MMHG | HEART RATE: 105 BPM | BODY MASS INDEX: 30.97 KG/M2 | DIASTOLIC BLOOD PRESSURE: 82 MMHG | WEIGHT: 174.8 LBS | HEIGHT: 63 IN

## 2024-04-09 DIAGNOSIS — R20.2 NUMBNESS AND TINGLING OF LEFT SIDE OF FACE: ICD-10-CM

## 2024-04-09 DIAGNOSIS — R20.0 NUMBNESS AND TINGLING IN LEFT ARM: ICD-10-CM

## 2024-04-09 DIAGNOSIS — R20.2 NUMBNESS AND TINGLING IN LEFT ARM: ICD-10-CM

## 2024-04-09 DIAGNOSIS — R20.2 NUMBNESS AND TINGLING IN LEFT ARM: Primary | ICD-10-CM

## 2024-04-09 DIAGNOSIS — R20.0 NUMBNESS AND TINGLING OF LEFT SIDE OF FACE: ICD-10-CM

## 2024-04-09 DIAGNOSIS — R20.0 NUMBNESS AND TINGLING IN LEFT ARM: Primary | ICD-10-CM

## 2024-04-09 PROCEDURE — 86334 IMMUNOFIX E-PHORESIS SERUM: CPT

## 2024-04-09 PROCEDURE — 82300 ASSAY OF CADMIUM: CPT

## 2024-04-09 PROCEDURE — 86235 NUCLEAR ANTIGEN ANTIBODY: CPT

## 2024-04-09 PROCEDURE — 86617 LYME DISEASE ANTIBODY: CPT

## 2024-04-09 PROCEDURE — 86431 RHEUMATOID FACTOR QUANT: CPT

## 2024-04-09 PROCEDURE — 82175 ASSAY OF ARSENIC: CPT

## 2024-04-09 PROCEDURE — 82784 ASSAY IGA/IGD/IGG/IGM EACH: CPT

## 2024-04-09 PROCEDURE — 86200 CCP ANTIBODY: CPT

## 2024-04-09 PROCEDURE — 82728 ASSAY OF FERRITIN: CPT

## 2024-04-09 PROCEDURE — 82550 ASSAY OF CK (CPK): CPT

## 2024-04-09 PROCEDURE — 36415 COLL VENOUS BLD VENIPUNCTURE: CPT

## 2024-04-09 PROCEDURE — 86140 C-REACTIVE PROTEIN: CPT

## 2024-04-09 PROCEDURE — 84165 PROTEIN E-PHORESIS SERUM: CPT

## 2024-04-09 PROCEDURE — 83825 ASSAY OF MERCURY: CPT

## 2024-04-09 PROCEDURE — 83655 ASSAY OF LEAD: CPT

## 2024-04-09 PROCEDURE — 82570 ASSAY OF URINE CREATININE: CPT

## 2024-04-09 PROCEDURE — 82607 VITAMIN B-12: CPT

## 2024-04-09 PROCEDURE — 82746 ASSAY OF FOLIC ACID SERUM: CPT

## 2024-04-09 PROCEDURE — 84155 ASSAY OF PROTEIN SERUM: CPT

## 2024-04-09 RX ORDER — MELOXICAM 7.5 MG/1
7.5 TABLET ORAL DAILY
COMMUNITY
Start: 2023-12-16

## 2024-04-09 RX ORDER — GLIPIZIDE 10 MG/1
20 TABLET, FILM COATED, EXTENDED RELEASE ORAL 2 TIMES DAILY
COMMUNITY

## 2024-04-09 RX ORDER — TAMSULOSIN HYDROCHLORIDE 0.4 MG/1
0.4 CAPSULE ORAL DAILY
COMMUNITY

## 2024-04-09 RX ORDER — ROSUVASTATIN CALCIUM 40 MG/1
40 TABLET, COATED ORAL DAILY
COMMUNITY
Start: 2023-12-16

## 2024-04-09 RX ORDER — AMITRIPTYLINE HYDROCHLORIDE 75 MG/1
75 TABLET ORAL DAILY
COMMUNITY
Start: 2024-01-24

## 2024-04-09 NOTE — PROGRESS NOTES
Neuro Office Visit      Encounter Date: 2024   Patient Name: Jeremiah Woods  : 1970   MRN: [id number]   PCP:  Magali Hill APRN     Chief Complaint:    Chief Complaint   Patient presents with    Numbness     Facial, left arm       History of Present Illness: Jeremiah Woods is a 54 y.o. male who is here today in Neurology for numbness.    Accompanied by his sister for the visit today his mother and sister are also present on the phone.    Numbness/tingling:  Had COVID approximately 3 years ago.    After that developed left facial numbness from the left ear radiating down the left cheek to the corner of his left mouth.    This area is the same where he had Bell's palsy several years ago.    He also has numbness from the elbow on the left arm down to his hands.  Initially he notes that it feels cold and then turns numb and tingly.    The areas feel cold on the inside but are not cold on the surface.    These episodes will occur up to 2 times daily and can last for a few minutes up to hours.    Report from MRI of the brain without contrast performed on 2023 showed a small asymmetric artifact in the right globus pallidus which was felt to be asymmetric mineralization but could not exclude a tiny chronic infarct.    He does not have the disc from the MRI and has been asked to try and get that so that we can take a look at the actual film.    He is a diabetic with the most recent hemoglobin A1c of 11.  Since he had COVID he has had more difficulty with glucose control.    Neuropathy in the bottoms of both feet which is managed with amitriptyline.    Memory loss/brain fog:  Again after COVID he has developed worsening brain fog.    Notes difficulty with word finding.    Difficulty recalling conversation.    Finds it hard to long in conversation.    Able to cook without forgetting food or forgetting to turn off the stove or oven.    Call of recent events is very poor.    Occasionally  forgets to pay bills.    Manages his own medications but occasionally forgets to take them.    No difficulty getting lost when he is driving.    MMSE 26/30      Subjective      Past Medical History:   Past Medical History:   Diagnosis Date    Anxiety     Bell's palsy     Depression     Diabetes mellitus     Elevated cholesterol     Hematuria     Multiple allergies     Neuropathy     Renal calculi     Type 2 diabetes mellitus        Past Surgical History:   Past Surgical History:   Procedure Laterality Date    CYSTOSCOPY LITHOLAPAXY BLADDER STONE EXTRACTION      EAR TUBES      KIDNEY STONE SURGERY         Family History:   Family History   Adopted: Yes   Family history unknown: Yes       Social History:   Social History     Socioeconomic History    Marital status:    Tobacco Use    Smoking status: Never    Smokeless tobacco: Never   Vaping Use    Vaping status: Never Used   Substance and Sexual Activity    Alcohol use: No    Drug use: No    Sexual activity: Yes     Partners: Female     Comment:         Medications:     Current Outpatient Medications:     amitriptyline (ELAVIL) 75 MG tablet, Take 1 tablet by mouth Daily., Disp: , Rfl:     coenzyme Q10 100 MG capsule, Take 1 capsule by mouth Daily., Disp: , Rfl:     fenofibrate (TRICOR) 145 MG tablet, Take 1 tablet by mouth Daily., Disp: 30 tablet, Rfl: 5    glipizide (GLUCOTROL XL) 10 MG 24 hr tablet, Take 2 tablets by mouth 2 (Two) Times a Day. Pt is taking 20 mg BID, Disp: , Rfl:     Insulin NPH, Human,, Isophane, (humuLIN N,novoLIN N) 100 UNIT/ML injection, Inject 12 Units under the skin into the appropriate area as directed., Disp: , Rfl:     lisinopril (PRINIVIL,ZESTRIL) 10 MG tablet, Take 1 tablet by mouth Daily., Disp: , Rfl:     meloxicam (MOBIC) 7.5 MG tablet, Take 1 tablet by mouth Daily., Disp: , Rfl:     metFORMIN (GLUCOPHAGE) 1000 MG tablet, Take 1 tablet by mouth 2 (Two) Times a Day With Meals., Disp: 60 tablet, Rfl: 3    rosuvastatin  (CRESTOR) 40 MG tablet, Take 1 tablet by mouth Daily., Disp: , Rfl:     tamsulosin (FLOMAX) 0.4 MG capsule 24 hr capsule, Take 1 capsule by mouth Daily., Disp: , Rfl:     Allergies:   Allergies   Allergen Reactions    Invokana [Canagliflozin] Shortness Of Breath and Rash    Contrast Dye (Echo Or Unknown Ct/Mr)     Penicillins     Sulfa Antibiotics     Adhesive Tape Rash       PHQ-9 Total Score:     STEADI Fall Risk Assessment has not been completed.    Objective     Physical Exam:   Physical Exam  Vitals reviewed.   Eyes:      Extraocular Movements: EOM normal.   Neurological:      Mental Status: He is oriented to person, place, and time.      Coordination: Finger-Nose-Finger Test, Heel to Shin Test and Romberg Test normal.      Gait: Gait is intact. Tandem walk normal.      Deep Tendon Reflexes:      Reflex Scores:       Tricep reflexes are 2+ on the right side and 2+ on the left side.       Bicep reflexes are 2+ on the right side and 2+ on the left side.       Brachioradialis reflexes are 2+ on the right side and 2+ on the left side.       Patellar reflexes are 2+ on the right side and 2+ on the left side.       Achilles reflexes are 2+ on the right side and 2+ on the left side.  Psychiatric:         Speech: Speech normal.         Neurologic Exam     Mental Status   Oriented to person, place, and time.   Disoriented to person.   Disoriented to place. Oriented to country, city, area, street and number.   Oriented to time. Oriented to year, month, date, day and season.   Registration: recalls 3 of 3 objects. Recall of objects at 5 minutes: 0/3. Follows 3 step commands.   Attention: normal. Concentration: normal.   Speech: speech is normal   Level of consciousness: alert  Knowledge: good. Able to perform simple calculations.   Able to name object. Able to read. Able to repeat. Able to write. Abnormal comprehension.     Cranial Nerves     CN II   Visual fields full to confrontation.     CN III, IV, VI   Extraocular  motions are normal.   CN III: no CN III palsy  CN VI: no CN VI palsy    CN V   Facial sensation intact.     CN VII   Facial expression full, symmetric.     CN VIII   CN VIII normal.     CN IX, X   CN IX normal.   CN X normal.     CN XI   CN XI normal.     CN XII   CN XII normal.     Motor Exam     Strength   Right neck flexion: 5/5  Left neck flexion: 5/5  Right neck extension: 5/5  Left neck extension: 5/5  Right deltoid: 5/5  Left deltoid: 5/5  Right biceps: 5/5  Left biceps: 5/5  Right triceps: 5/5  Left triceps: 5/5  Right wrist flexion: 5/5  Left wrist flexion: 5/5  Right wrist extension: 5/5  Left wrist extension: 5/5  Right interossei: 5/5  Left interossei: 5/5  Right abdominals: 5/5  Left abdominals: 5/5  Right iliopsoas: 5/5  Left iliopsoas: 5/5  Right quadriceps: 5/5  Left quadriceps: 5/5  Right hamstrin/5  Left hamstrin/5  Right glutei: 5/5  Left glutei: 5/5  Right anterior tibial: 5/5  Left anterior tibial: 5/5  Right posterior tibial: 5/5  Left posterior tibial: 5/5  Right peroneal: 5/5  Left peroneal: 5/5  Right gastroc: 5/5  Left gastroc: 5/5    Sensory Exam   Right arm light touch: normal  Left arm light touch: decreased from elbow  Right arm proprioception: normal  Left arm proprioception: normal  Right arm pinprick: normal  Left arm pinprick: decreased from elbow    Gait, Coordination, and Reflexes     Gait  Gait: normal    Coordination   Romberg: negative  Finger to nose coordination: normal  Heel to shin coordination: normal  Tandem walking coordination: normal    Tremor   Resting tremor: absent  Intention tremor: absent  Action tremor: absent    Reflexes   Right brachioradialis: 2+  Left brachioradialis: 2+  Right biceps: 2+  Left biceps: 2+  Right triceps: 2+  Left triceps: 2+  Right patellar: 2+  Left patellar: 2+  Right achilles: 2+  Left achilles: 2+  Right : 2+  Left : 2+       Vital Signs:   Vitals:    24 1259   BP: 118/82   Pulse: 105   SpO2: 94%   Weight: 79.3 kg  "(174 lb 12.8 oz)   Height: 160 cm (63\")     Body mass index is 30.96 kg/m².     Results:   Imaging:   CT Abdomen Pelvis Without Contrast    Result Date: 3/19/2024  Severe left hydronephrosis from a 9 mm stone in the proximal ureter. Images reviewed, interpreted, and dictated by DEBI Mcadams MD       Labs:    No results found for: \"CMP\", \"PROTEIN\", \"ANTIMOGAB\", \"FQIOMD1RJWN\", \"JCVRESULT\", \"QUANTTBGOLD\", \"CBCDIF\", \"IGGALBSER\"     Assessment / Plan      Assessment/Plan:   Diagnoses and all orders for this visit:    1. Numbness and tingling in left arm (Primary)  -     CK; Future  -     C-reactive Protein; Future  -     Ferritin; Future  -     Rheumatoid Arthritis (RA) Profile; Future  -     Sjogren's Antibody, Anti-SS-A / -SS-B; Future  -     Vitamin B12 & Folate; Future  -     DAT + PE; Future  -     Heavy Metals Profile II, Urine - Urine, Clean Catch; Future  -     Lyme Disease, Line Blot; Future  -     EMG & Nerve Conduction Test; Future    2. Numbness and tingling of left side of face  -     CK; Future  -     C-reactive Protein; Future  -     Ferritin; Future  -     Rheumatoid Arthritis (RA) Profile; Future  -     Sjogren's Antibody, Anti-SS-A / -SS-B; Future  -     Vitamin B12 & Folate; Future  -     DAT + PE; Future  -     Heavy Metals Profile II, Urine - Urine, Clean Catch; Future  -     Lyme Disease, Line Blot; Future  -     EMG & Nerve Conduction Test; Future           Patient Education:     Reviewed medications, potential side effects and signs and symptoms to report. Discussed risk versus benefits of treatment plan with patient and/or family-including medications, labs and radiology that may be ordered. Addressed questions and concerns during visit. Patient and/or family verbalized understanding and agree with plan. Instructed to call the office with any questions and report to ER with any life-threatening symptoms.     Follow Up:   Return in about 3 months (around 7/9/2024).    I spent 30  minutes " face to face with the patient and family. I personally spent 50 percent of this time counseling and discussing diagnosis, diagnostic testing, driving, treatment options, and management .       During this visit the following were done:  Labs Reviewed [x]    Labs Ordered [x]    Radiology Reports Reviewed [x]    Radiology Ordered []    PCP Records Reviewed [x]    Referring Provider Records Reviewed [x]    ER Records Reviewed []    Hospital Records Reviewed []    History Obtained From Family []    Radiology Images Reviewed []    Other Reviewed []    Records Requested []      RAHEEM Stone  [facility] [place] [facility] NE[facility]  [address] [place] [address]  [facility] [id number] [phone]

## 2024-04-10 LAB
CK SERPL-CCNC: 138 U/L (ref 20–200)
CRP SERPL-MCNC: <0.3 MG/DL (ref 0–0.5)
FERRITIN SERPL-MCNC: 79.2 NG/ML (ref 30–400)
FOLATE SERPL-MCNC: 8.41 NG/ML (ref 4.78–24.2)
VIT B12 BLD-MCNC: 486 PG/ML (ref 211–946)

## 2024-04-11 LAB
ALBUMIN SERPL ELPH-MCNC: 4.5 G/DL (ref 2.9–4.4)
ALBUMIN/GLOB SERPL: 1.3 {RATIO} (ref 0.7–1.7)
ALPHA1 GLOB SERPL ELPH-MCNC: 0.2 G/DL (ref 0–0.4)
ALPHA2 GLOB SERPL ELPH-MCNC: 0.9 G/DL (ref 0.4–1)
B-GLOBULIN SERPL ELPH-MCNC: 1.2 G/DL (ref 0.7–1.3)
CCP IGA+IGG SERPL IA-ACNC: 3 UNITS (ref 0–19)
ENA SS-A AB SER-ACNC: <0.2 AI (ref 0–0.9)
ENA SS-B AB SER-ACNC: <0.2 AI (ref 0–0.9)
GAMMA GLOB SERPL ELPH-MCNC: 1.3 G/DL (ref 0.4–1.8)
GLOBULIN SER-MCNC: 3.6 G/DL (ref 2.2–3.9)
IGA SERPL-MCNC: 321 MG/DL (ref 90–386)
IGG SERPL-MCNC: 1276 MG/DL (ref 603–1613)
IGM SERPL-MCNC: 197 MG/DL (ref 20–172)
INTERPRETATION SERPL IEP-IMP: ABNORMAL
LABORATORY COMMENT REPORT: ABNORMAL
M PROTEIN SERPL ELPH-MCNC: ABNORMAL G/DL
PROT SERPL-MCNC: 8.1 G/DL (ref 6–8.5)
RHEUMATOID FACT SERPL-ACNC: <10 IU/ML

## 2024-04-15 LAB

## 2024-04-15 NOTE — PROGRESS NOTES
Please let Zach know that the result from the Lyme disease test was negative.  Other labs were negative as well.

## 2024-04-17 ENCOUNTER — TELEPHONE (OUTPATIENT)
Dept: NEUROLOGY | Facility: CLINIC | Age: 54
End: 2024-04-17
Payer: MEDICAID

## 2024-04-17 NOTE — TELEPHONE ENCOUNTER
----- Message from RAHEEM Stone sent at 4/15/2024  5:15 PM EDT -----  Please let Zach know that the result from the Lyme disease test was negative.  Other labs were negative as well.

## 2024-04-17 NOTE — TELEPHONE ENCOUNTER
"Relay     \"Lab results from the Lyme disease test was negative. Other labs were negative as well. \"          "

## 2024-04-26 LAB
ARSENIC UR-MCNC: 15 UG/L (ref 0–9)
ARSENIC.INORGANIC+METHYLATED 24H UR-MCNC: <20 UG/L
ARSENIC/CREAT UR: 16 UG/G CREAT
CADMIUM 24H UR-MCNC: ABNORMAL UG/L
CREAT UR-MCNC: 0.93 G/L (ref 0.3–3)
DIMETHYLARSINATE UR-MCNC: <5 UG/L
INORG ARSENIC UR-MCNC: <10 UG/L
LEAD 24H UR-MCNC: ABNORMAL UG/L (ref 0–49)
Lab: NORMAL
MERCURY 24H UR-MCNC: ABNORMAL UG/L (ref 0–19)
MMA UR-MCNC: <5 UG/L

## 2024-04-29 ENCOUNTER — TELEPHONE (OUTPATIENT)
Dept: NEUROLOGY | Facility: CLINIC | Age: 54
End: 2024-04-29
Payer: MEDICAID

## 2024-04-29 NOTE — TELEPHONE ENCOUNTER
Spoke with the Pt to let them know for their lab results, the metals profile showed a slightly elevated arsenic level but nothing significant.     Pt asked what Arsenic is, let the Pt know it can found in fish and it happens to about 30% of the population.    Pt verbalized understanding.

## 2024-04-29 NOTE — TELEPHONE ENCOUNTER
----- Message from Estefani SOLORZANO sent at 4/26/2024  5:21 PM EDT -----  Please let Zach know that the metals profile showed a slightly elevated arsenic level but nothing significant.